# Patient Record
Sex: MALE | Race: BLACK OR AFRICAN AMERICAN | Employment: UNEMPLOYED | URBAN - METROPOLITAN AREA
[De-identification: names, ages, dates, MRNs, and addresses within clinical notes are randomized per-mention and may not be internally consistent; named-entity substitution may affect disease eponyms.]

---

## 2017-12-21 ENCOUNTER — HOSPITAL ENCOUNTER (EMERGENCY)
Age: 82
Discharge: HOME | End: 2017-12-21
Attending: EMERGENCY MEDICINE
Payer: MEDICARE

## 2017-12-21 VITALS
BODY MASS INDEX: 25.05 KG/M2 | WEIGHT: 175 LBS | RESPIRATION RATE: 16 BRPM | TEMPERATURE: 97.2 F | HEART RATE: 85 BPM | OXYGEN SATURATION: 100 % | HEIGHT: 70 IN | DIASTOLIC BLOOD PRESSURE: 82 MMHG | SYSTOLIC BLOOD PRESSURE: 130 MMHG

## 2017-12-21 DIAGNOSIS — H11.32 SUBCONJUNCTIVAL HEMORRHAGE OF LEFT EYE: Primary | ICD-10-CM

## 2017-12-21 PROCEDURE — 99282 EMERGENCY DEPT VISIT SF MDM: CPT

## 2017-12-21 RX ORDER — ACETAMINOPHEN 160 MG
TABLET,DISINTEGRATING ORAL DAILY
COMMUNITY

## 2017-12-21 RX ORDER — SPIRONOLACTONE 25 MG/1
25 TABLET ORAL DAILY
COMMUNITY

## 2017-12-21 RX ORDER — DILTIAZEM HYDROCHLORIDE 120 MG/1
120 CAPSULE, COATED, EXTENDED RELEASE ORAL DAILY
COMMUNITY

## 2017-12-21 RX ORDER — FUROSEMIDE 40 MG/1
40 TABLET ORAL 2 TIMES DAILY
COMMUNITY

## 2017-12-21 RX ORDER — METOPROLOL TARTRATE 50 MG/1
50 TABLET, FILM COATED ORAL 2 TIMES DAILY
COMMUNITY

## 2017-12-21 RX ORDER — ALBUTEROL SULFATE 0.63 MG/3ML
1 SOLUTION RESPIRATORY (INHALATION) EVERY 6 HOURS PRN
COMMUNITY

## 2017-12-21 RX ORDER — POTASSIUM CHLORIDE 20 MEQ/1
20 TABLET, EXTENDED RELEASE ORAL 2 TIMES DAILY
COMMUNITY

## 2017-12-21 RX ORDER — LEVOTHYROXINE SODIUM 0.05 MG/1
50 TABLET ORAL DAILY
COMMUNITY

## 2017-12-21 RX ORDER — ALBUTEROL SULFATE 90 UG/1
2 AEROSOL, METERED RESPIRATORY (INHALATION) EVERY 6 HOURS PRN
COMMUNITY

## 2017-12-21 ASSESSMENT — ENCOUNTER SYMPTOMS
EYE REDNESS: 1
COUGH: 0
COLOR CHANGE: 0
EYE PAIN: 0
EYE ITCHING: 0
SHORTNESS OF BREATH: 0
EYE DISCHARGE: 0
SORE THROAT: 0
SINUS PRESSURE: 0
PHOTOPHOBIA: 0

## 2017-12-21 ASSESSMENT — VISUAL ACUITY
OS: 20/25
OD: 20/40
OU: 20/25

## 2017-12-21 NOTE — ED PROVIDER NOTES
The patient was seen and examined by me in conjunction with the mid-level provider. I agree with his/her assessment and treatment plan. My clinical impression is that he has a subconjunctival hemorrhage.       Mychal Lowry MD  12/21/17 1122

## 2017-12-21 NOTE — ED PROVIDER NOTES
[facility] ED  eMERGENCY dEPARTMENT eNCOUnter      Pt Name: Wily Orourke  MRN: [id number]  [place] 9/23/1931  Date of evaluation: 12/21/2017  Provider: Deb Cornejo NP    [address] [place] [address]       Chief Complaint   Patient presents with    Red Eye     left onset last Tues         HISTORY OF PRESENT ILLNESS  (Location/Symptom, Timing/Onset, Context/Setting, Quality, Duration, Modifying Factors, Severity.)   Wily Orourke is a 80 y.o. male who presents to the emergency department via private auto for erythema to his left eye. States he woke up with it 12/19/17. Denies injury, fever, chills, vision changes, itching, pain. He is taking xarelto. Denies pain. He had cataract surgery several years ago. Nursing Notes were reviewed. ALLERGIES     Belsomra [suvorexant]; Dulera [mometasone furo-formoterol fum];  Spiriva handihaler [tiotropium bromide monohydrate]; and Other    CURRENT MEDICATIONS       Discharge Medication List as of 12/21/2017 11:26 AM      CONTINUE these medications which have NOT CHANGED    Details   albuterol (ACCUNEB) 0.63 MG/3ML nebulizer solution Take 1 ampule by nebulization every 6 hours as needed for WheezingHistorical Med      aspirin 81 MG tablet Take 81 mg by mouth dailyHistorical Med      diltiazem (CARDIZEM CD) 120 MG extended release capsule Take 120 mg by mouth dailyHistorical Med      CPAP Machine MISC NIGHTLY, Historical Med      furosemide (LASIX) 40 MG tablet Take 40 mg by mouth 2 times dailyHistorical Med      potassium chloride (KLOR-CON M20) 20 MEQ extended release tablet Take 20 mEq by mouth 2 times dailyHistorical Med      levothyroxine (SYNTHROID) 50 MCG tablet Take 50 mcg by mouth DailyHistorical Med      metoprolol tartrate (LOPRESSOR) 50 MG tablet Take 50 mg by mouth 2 times dailyHistorical Med      OXYGEN Inhale into the lungsHistorical Med      ferrous sulfate (SLOW FE) 142 (45 Fe) MG extended release tablet Take 142 mg by mouth dailyHistorical Med      spironolactone (ALDACTONE) 25 MG tablet Take 25 mg by mouth dailyHistorical Med      Cholecalciferol (VITAMIN D3) 2000 units CAPS Take by mouth dailyHistorical Med      rivaroxaban (XARELTO) 15 MG TABS tablet Take 15 mg by mouthHistorical Med      albuterol sulfate  (90 Base) MCG/ACT inhaler Inhale 2 puffs into the lungs every 6 hours as needed for WheezingHistorical Med             PAST MEDICAL HISTORY         Diagnosis Date    Anemia     Asthma     CHF (congestive heart failure) (HCC)     Hypertension     Thyroid disease        SURGICAL HISTORY           Procedure Laterality Date    CATARACT REMOVAL WITH IMPLANT Bilateral     PACEMAKER INSERTION           FAMILY HISTORY     History reviewed. No pertinent family history. No family status information on file. SOCIAL HISTORY      reports that he has quit smoking. He has never used smokeless tobacco. He reports that he drinks alcohol. He reports that he does not use drugs. REVIEW OF SYSTEMS    (2-9 systems for level 4, 10 or more for level 5)     Review of Systems   Constitutional: Negative for chills, diaphoresis, fatigue and fever. HENT: Negative for congestion, nosebleeds, sinus pressure, sneezing and sore throat. Eyes: Positive for redness. Negative for photophobia, pain, discharge, itching and visual disturbance. Respiratory: Negative for cough and shortness of breath. Cardiovascular: Negative for chest pain. Skin: Negative for color change, rash and wound. Neurological: Negative for dizziness, light-headedness and headaches. Except as noted above the remainder of the review of systems was reviewed and negative.      PHYSICAL EXAM    (up to 7 for level 4, 8 or more for level 5)     ED Triage Vitals [12/21/17 1108]   BP Temp Temp Source Pulse Resp SpO2 Height Weight   130/82 97.2 °F (36.2 °C) Oral 85 16 100 % 5' 10\" (1.778 m) 175 lb (79.4 kg)     Physical Exam   Constitutional: He is oriented to person, place, and time. He appears well-developed and well-nourished. No distress. Eyes: EOM are normal. Pupils are equal, round, and reactive to light. Left eye exhibits no discharge. Left conjunctiva has a hemorrhage. Cardiovascular: Normal rate, regular rhythm and normal heart sounds. Pulmonary/Chest: Effort normal and breath sounds normal. No respiratory distress. He has no wheezes. He has no rales. Neurological: He is alert and oriented to person, place, and time. Skin: Skin is warm and dry. No rash noted. He is not diaphoretic. Psychiatric: He has a normal mood and affect. His behavior is normal.   Vitals reviewed. EMERGENCY DEPARTMENT COURSE and DIFFERENTIAL DIAGNOSIS/MDM:   Vitals:    Vitals:    12/21/17 1108   BP: 130/82   Pulse: 85   Resp: 16   Temp: 97.2 °F (36.2 °C)   TempSrc: Oral   SpO2: 100%   Weight: 175 lb (79.4 kg)   Height: 5' 10\" (1.778 m)         CLINICAL DECISION MAKING:  The patient presented alert with a nontoxic appearance and was seen in conjunction with Dr. Chely Rangel. Follow up with pcp, ophthalmology; return to ED if condition worsens. FINAL IMPRESSION      1.  Subconjunctival hemorrhage of left eye            DISPOSITION/PLAN   DISPOSITION Decision To Discharge 12/21/2017 11:25:15 AM      PATIENT REFERRED TO:   Shanna Armijo MD  [place] [address]  [phone]    Schedule an appointment as soon as possible for a visit       Angle Whitmore MD  [address] ED  [address] [facility]  [phone]    If symptoms worsen      DISCHARGE MEDICATIONS:     Discharge Medication List as of 12/21/2017 11:26 AM              (Please note that portions of this note were completed with a voice recognition program.  Efforts were made to edit the dictations but occasionally words are mis-transcribed.)    JERE Worthy NP  12/21/17 1635